# Patient Record
Sex: MALE | Race: ASIAN | NOT HISPANIC OR LATINO | URBAN - METROPOLITAN AREA
[De-identification: names, ages, dates, MRNs, and addresses within clinical notes are randomized per-mention and may not be internally consistent; named-entity substitution may affect disease eponyms.]

---

## 2020-08-04 ENCOUNTER — EMERGENCY (EMERGENCY)
Facility: HOSPITAL | Age: 30
LOS: 1 days | Discharge: HOME | End: 2020-08-04
Attending: EMERGENCY MEDICINE
Payer: MEDICAID

## 2020-08-04 VITALS
RESPIRATION RATE: 20 BRPM | DIASTOLIC BLOOD PRESSURE: 94 MMHG | HEIGHT: 68 IN | SYSTOLIC BLOOD PRESSURE: 152 MMHG | HEART RATE: 89 BPM | TEMPERATURE: 98 F | WEIGHT: 180.78 LBS | OXYGEN SATURATION: 97 %

## 2020-08-04 PROCEDURE — 99283 EMERGENCY DEPT VISIT LOW MDM: CPT

## 2020-08-04 RX ORDER — FAMOTIDINE 10 MG/ML
20 INJECTION INTRAVENOUS DAILY
Refills: 0 | Status: DISCONTINUED | OUTPATIENT
Start: 2020-08-04 | End: 2020-08-08

## 2020-08-04 RX ORDER — FAMOTIDINE 10 MG/ML
1 INJECTION INTRAVENOUS
Qty: 7 | Refills: 0
Start: 2020-08-04

## 2020-08-04 RX ORDER — DIPHENHYDRAMINE HCL 50 MG
1 CAPSULE ORAL
Qty: 20 | Refills: 0
Start: 2020-08-04

## 2020-08-04 RX ADMIN — FAMOTIDINE 20 MILLIGRAM(S): 10 INJECTION INTRAVENOUS at 15:26

## 2020-08-04 RX ADMIN — Medication 50 MILLIGRAM(S): at 15:26

## 2020-08-04 NOTE — ED PROVIDER NOTE - CLINICAL SUMMARY MEDICAL DECISION MAKING FREE TEXT BOX
Patient presents to the ED with complaints of urticaria of unknown cause. Will discharge with antihistamines and steroids. Followup information for dermatology provided in case that symptoms do not resolve. Return precautions provided.

## 2020-08-04 NOTE — ED PROVIDER NOTE - SKIN, MLM
Scattered urticaria most prominent on the dorsum of the bilateral feet, but also scattered around the forehead, temples, and dorsum of the hands. No involvement of the palms or soles. No evidence of infection. No excoriation marks.

## 2020-08-04 NOTE — ED ADULT NURSE NOTE - NURSING ED SKIN COLOR
normal for race Complex Repair And V-Y Plasty Text: The defect edges were debeveled with a #15 scalpel blade.  The primary defect was closed partially with a complex linear closure.  Given the location of the remaining defect, shape of the defect and the proximity to free margins a V-Y plasty was deemed most appropriate for complete closure of the defect.  Using a sterile surgical marker, an appropriate advancement flap was drawn incorporating the defect and placing the expected incisions within the relaxed skin tension lines where possible.    The area thus outlined was incised deep to adipose tissue with a #15 scalpel blade.  The skin margins were undermined to an appropriate distance in all directions utilizing iris scissors.

## 2020-08-04 NOTE — ED PROVIDER NOTE - OBJECTIVE STATEMENT
30 year old male with no significant PMHx presents to the ED with complaints of a rash for one week. Patient reports that approximately one week ago he went to the beach, and states that he went into the water at his knee-level while wearing shorts and splashed some water onto his face. Patient states that one day later, he developed a rash to the exposed areas to his body where he touched the water. Patient endorse that the rash started small before eventually becoming more prominent and itchy. Patient states that he did not take any medication for the itching until yesterday when he took 20 mg Claritin without any significant improvement. Patient otherwise denies any fevers, swelling, induration, pain at the location of the rash, and all other acute complaints. Patient denies having any history of allergies. NKDA.

## 2020-08-04 NOTE — ED PROVIDER NOTE - PATIENT PORTAL LINK FT
You can access the FollowMyHealth Patient Portal offered by [facility] by registering at the following website: [email or web address]. By joining [employer]’s FollowMyHealth portal, you will also be able to view your health information using other applications (apps) compatible with our system.

## 2020-08-09 ENCOUNTER — EMERGENCY (EMERGENCY)
Facility: HOSPITAL | Age: 30
LOS: 1 days | Discharge: HOME | End: 2020-08-09
Attending: EMERGENCY MEDICINE
Payer: MEDICAID

## 2020-08-09 VITALS
WEIGHT: 178.57 LBS | OXYGEN SATURATION: 97 % | HEART RATE: 77 BPM | HEIGHT: 66.14 IN | RESPIRATION RATE: 16 BRPM | DIASTOLIC BLOOD PRESSURE: 82 MMHG | TEMPERATURE: 98 F | SYSTOLIC BLOOD PRESSURE: 139 MMHG

## 2020-08-09 PROCEDURE — 99282 EMERGENCY DEPT VISIT SF MDM: CPT

## 2020-08-09 PROCEDURE — 99284 EMERGENCY DEPT VISIT MOD MDM: CPT

## 2020-08-09 RX ORDER — HYDROCORTISONE 1 %
1 OINTMENT (GRAM) TOPICAL
Qty: 30 | Refills: 0
Start: 2020-08-09 | End: 2020-08-15

## 2020-08-09 NOTE — ED PROVIDER NOTE - CLINICAL SUMMARY MEDICAL DECISION MAKING FREE TEXT BOX
30 year-old male presents with persistent pruritic rash to legs/arms. History and findings suggestive of swimmer's itch although less likely due to salt water exposure. Rash is not vesicular or petechial. Will dc with derm follow up.

## 2020-08-09 NOTE — ED PROVIDER NOTE - PROGRESS NOTE DETAILS
Will refer to derm for further eval. Will give Rx for hydrocortisone cream. No e/o erythema multiforme, SJS/TEN, Lyme, cellulitis, necrotizing fasciitis, no angioedema, meningococcemia, bo mountain spotted fever. Pt is well appearing walking with steady gait, stable for discharge and follow up without fail with medical doctor. I had a detailed discussion with the patient and/or guardian regarding the historical points, exam findings, and any diagnostic results supporting the discharge diagnosis. Pt educated on care and need for follow up. Strict return instructions and red flag signs and symptoms discussed with patient. Questions answered. Pt shows understanding of discharge information and agrees to follow.

## 2020-08-09 NOTE — ED PROVIDER NOTE - OBJECTIVE STATEMENT
30 year-old male, presents with cc rash x 5 days. Reports that 5 days ago went to the beach and dipped both arms and legs in salt water and started having itchy rash to the areas exposed to water. Also reports that was bit by mosquitos that day. Was seen in the ER and was given meds which he says helped the first 2 days but then symptoms got worse. Still has rash but slowly improving. Denies any fever, chills, joint pain, worsening redness, facial swelling, or any other complaints.

## 2020-08-09 NOTE — ED PROVIDER NOTE - PHYSICAL EXAMINATION
Scattered papular rash to the lower extremities (knees down) and upper extremities (elbow down). No rash to web spaces, folds. No erythema, induration or streaking. No lymphadenopathy.

## 2020-08-09 NOTE — ED PROVIDER NOTE - NSFOLLOWUPINSTRUCTIONS_ED_ALL_ED_FT
Follow up with the dermatologist within 1 week.  If you experience any new or worsening symptoms or if you are concerned you can always come back to the emergency for a re-evaluation.  If there were any prescriptions given to you during the visit today take them as prescribed. If you have any questions you can ask the pharmacist.

## 2020-08-09 NOTE — ED PROVIDER NOTE - ATTENDING CONTRIBUTION TO CARE
I completed an independent physical examination.   I have signed out the follow up of any pending tests (i.e. labs, radiological studies) to the PA/NP.  I have discussed the patient’s plan of care and disposition with the PA/NP    Patient with rash, will give steroid cream and dc with derm follow up.

## 2020-08-10 ENCOUNTER — APPOINTMENT (OUTPATIENT)
Dept: DERMATOLOGY | Facility: CLINIC | Age: 30
End: 2020-08-10

## 2020-08-10 PROBLEM — Z00.00 ENCOUNTER FOR PREVENTIVE HEALTH EXAMINATION: Status: ACTIVE | Noted: 2020-08-10

## 2020-08-10 PROBLEM — Z78.9 OTHER SPECIFIED HEALTH STATUS: Chronic | Status: ACTIVE | Noted: 2020-08-04
